# Patient Record
Sex: FEMALE | Race: WHITE | HISPANIC OR LATINO
[De-identification: names, ages, dates, MRNs, and addresses within clinical notes are randomized per-mention and may not be internally consistent; named-entity substitution may affect disease eponyms.]

---

## 2018-06-20 PROBLEM — Z00.00 ENCOUNTER FOR PREVENTIVE HEALTH EXAMINATION: Status: ACTIVE | Noted: 2018-06-20

## 2018-07-17 DIAGNOSIS — Z86.2 PERSONAL HISTORY OF DISEASES OF THE BLOOD AND BLOOD-FORMING ORGANS AND CERTAIN DISORDERS INVOLVING THE IMMUNE MECHANISM: ICD-10-CM

## 2018-07-17 DIAGNOSIS — Z86.59 PERSONAL HISTORY OF OTHER MENTAL AND BEHAVIORAL DISORDERS: ICD-10-CM

## 2018-07-17 DIAGNOSIS — Z87.19 PERSONAL HISTORY OF OTHER DISEASES OF THE DIGESTIVE SYSTEM: ICD-10-CM

## 2018-07-17 DIAGNOSIS — Z87.39 PERSONAL HISTORY OF OTHER DISEASES OF THE MUSCULOSKELETAL SYSTEM AND CONNECTIVE TISSUE: ICD-10-CM

## 2018-07-17 DIAGNOSIS — Z86.39 PERSONAL HISTORY OF OTHER ENDOCRINE, NUTRITIONAL AND METABOLIC DISEASE: ICD-10-CM

## 2018-07-17 RX ORDER — ATENOLOL 50 MG/1
50 TABLET ORAL TWICE DAILY
Refills: 0 | Status: ACTIVE | COMMUNITY

## 2018-07-17 RX ORDER — AMLODIPINE BESYLATE 5 MG/1
5 TABLET ORAL DAILY
Refills: 0 | Status: ACTIVE | COMMUNITY

## 2018-07-17 RX ORDER — FUROSEMIDE 20 MG/1
20 TABLET ORAL DAILY
Refills: 0 | Status: ACTIVE | COMMUNITY

## 2018-07-17 RX ORDER — DIGOXIN 125 UG/1
125 TABLET ORAL DAILY
Refills: 0 | Status: ACTIVE | COMMUNITY

## 2018-07-24 ENCOUNTER — APPOINTMENT (OUTPATIENT)
Dept: CARDIOLOGY | Facility: CLINIC | Age: 83
End: 2018-07-24

## 2018-07-24 ENCOUNTER — NON-APPOINTMENT (OUTPATIENT)
Age: 83
End: 2018-07-24

## 2018-07-24 VITALS
TEMPERATURE: 98.3 F | RESPIRATION RATE: 12 BRPM | WEIGHT: 176 LBS | OXYGEN SATURATION: 97 % | SYSTOLIC BLOOD PRESSURE: 149 MMHG | HEART RATE: 72 BPM | DIASTOLIC BLOOD PRESSURE: 64 MMHG

## 2018-07-24 RX ORDER — DULOXETINE HYDROCHLORIDE 30 MG/1
30 CAPSULE, DELAYED RELEASE ORAL EVERY 4 HOURS
Refills: 0 | Status: ACTIVE | COMMUNITY

## 2018-07-24 RX ORDER — DULOXETINE HYDROCHLORIDE 60 MG/1
60 CAPSULE, DELAYED RELEASE ORAL EVERY MORNING
Refills: 0 | Status: ACTIVE | COMMUNITY

## 2018-07-24 RX ORDER — OMEPRAZOLE 20 MG/1
20 CAPSULE, DELAYED RELEASE ORAL DAILY
Refills: 0 | Status: ACTIVE | COMMUNITY

## 2019-01-14 ENCOUNTER — APPOINTMENT (OUTPATIENT)
Dept: CARDIOLOGY | Facility: CLINIC | Age: 84
End: 2019-01-14
Payer: MEDICARE

## 2019-01-14 ENCOUNTER — NON-APPOINTMENT (OUTPATIENT)
Age: 84
End: 2019-01-14

## 2019-01-14 VITALS
OXYGEN SATURATION: 98 % | DIASTOLIC BLOOD PRESSURE: 64 MMHG | SYSTOLIC BLOOD PRESSURE: 134 MMHG | WEIGHT: 174 LBS | RESPIRATION RATE: 12 BRPM | HEART RATE: 97 BPM

## 2019-01-14 PROCEDURE — 93000 ELECTROCARDIOGRAM COMPLETE: CPT

## 2019-01-14 PROCEDURE — 99214 OFFICE O/P EST MOD 30 MIN: CPT

## 2019-01-14 RX ORDER — METFORMIN HYDROCHLORIDE 500 MG/1
500 TABLET, COATED ORAL TWICE DAILY
Refills: 0 | Status: ACTIVE | COMMUNITY

## 2019-01-14 NOTE — HISTORY OF PRESENT ILLNESS
[FreeTextEntry1] : 86 yo female resident from [facility] with chronic atrial fibrillation and hypertension. Patient presents today for follow-up. Doing well. She denies new complaints. Patient denies chest pain, dyspnea, palpitations, syncope, edema, melena, hematochezia, or hematemesis.\par \par PMD: Papito Marcum MD

## 2019-01-14 NOTE — PHYSICAL EXAM
[General Appearance - Well Developed] : well developed [General Appearance - In No Acute Distress] : no acute distress [No Oral Cyanosis] : no oral cyanosis [FreeTextEntry1] : No JVD present [] : no respiratory distress [Respiration, Rhythm And Depth] : normal respiratory rhythm and effort [Auscultation Breath Sounds / Voice Sounds] : lungs were clear to auscultation bilaterally [Bowel Sounds] : normal bowel sounds [Abdomen Soft] : soft [Abdomen Tenderness] : non-tender [Nail Clubbing] : no clubbing of the fingernails [Oriented To Time, Place, And Person] : oriented to person, place, and time [Normal Rate] : normal [Irregularly Irregular] : irregularly irregular [Normal S1] : normal S1 [Normal S2] : normal S2 [S3] : no S3 [S4] : no S4 [No Murmur] : no murmurs heard [Right Carotid Bruit] : no bruit heard over the right carotid [Left Carotid Bruit] : no bruit heard over the left carotid [2+] : left 2+ [No Pitting Edema] : no pitting edema present

## 2019-01-14 NOTE — ASSESSMENT
[FreeTextEntry1] : 86 yo female with chronic atrial fibrillation and hypertension. Patient is clinically stable from cardiac standpoint and without symptoms to suggest progression of cardiac disease. Echo on 9/22/17 demonstrated low normal LV systolic function, LVEF 50-55%, mild to mod MR, mild AR, mild to mod TR. Lexiscan nuclear stress test on 10/6/17 demonstrated normal rest/stress myocardial perfusion with normal LV wall motion, LVEF 55-60%.\par \par Patient's chronic atrial fibrillation is rate controlled on current regimen of atenolol 50 mg po bid, digoxin, and verapamil 120 mg po bid. \par Currently on coumadin, which is being managed by her PMD. Goal INR 2-3. Will continue to monitor on current management. \par \par Blood pressure is currently adequately controlled. Will continue to monitor on current regimen.\par \par RTC in 6 months

## 2019-07-01 ENCOUNTER — NON-APPOINTMENT (OUTPATIENT)
Age: 84
End: 2019-07-01

## 2019-07-01 ENCOUNTER — APPOINTMENT (OUTPATIENT)
Dept: CARDIOLOGY | Facility: CLINIC | Age: 84
End: 2019-07-01
Payer: MEDICARE

## 2019-07-01 VITALS
RESPIRATION RATE: 12 BRPM | DIASTOLIC BLOOD PRESSURE: 88 MMHG | WEIGHT: 164 LBS | OXYGEN SATURATION: 94 % | SYSTOLIC BLOOD PRESSURE: 152 MMHG | HEART RATE: 115 BPM

## 2019-07-01 DIAGNOSIS — E78.5 HYPERLIPIDEMIA, UNSPECIFIED: ICD-10-CM

## 2019-07-01 DIAGNOSIS — Z86.79 PERSONAL HISTORY OF OTHER DISEASES OF THE CIRCULATORY SYSTEM: ICD-10-CM

## 2019-07-01 PROCEDURE — 99213 OFFICE O/P EST LOW 20 MIN: CPT

## 2019-07-01 PROCEDURE — 93000 ELECTROCARDIOGRAM COMPLETE: CPT

## 2019-07-01 RX ORDER — DILTIAZEM HYDROCHLORIDE 240 MG/1
240 CAPSULE, COATED, EXTENDED RELEASE ORAL DAILY
Refills: 0 | Status: ACTIVE | COMMUNITY

## 2019-07-01 RX ORDER — MIRTAZAPINE 15 MG/1
15 TABLET, FILM COATED ORAL
Refills: 0 | Status: ACTIVE | COMMUNITY

## 2019-07-01 RX ORDER — ASPIRIN 81 MG/1
81 TABLET ORAL DAILY
Refills: 0 | Status: ACTIVE | COMMUNITY

## 2019-07-01 RX ORDER — LEVETIRACETAM 500 MG/1
500 TABLET, FILM COATED ORAL TWICE DAILY
Refills: 0 | Status: ACTIVE | COMMUNITY

## 2019-07-01 NOTE — ASSESSMENT
[FreeTextEntry1] : 88 yo female with chronic atrial fibrillation and hypertension. Patient is clinically stable from cardiac standpoint and without symptoms to suggest progression of cardiac disease. Echo on 9/22/17 demonstrated low normal LV systolic function, LVEF 50-55%, mild to mod MR, mild AR, mild to mod TR. Lexiscan nuclear stress test on 10/6/17 demonstrated normal rest/stress myocardial perfusion with normal LV wall motion, LVEF 55-60%.\par \par Patient's chronic atrial fibrillation is adequately rate controlled on current regimen of atenolol 50 mg po bid, digoxin, and diltiazem  mg po daily. . \par Currently on aspirin alone for thromboembolic prophylaxis following her intracranial hemorrhage due to fall/head trauma while on coumadin in Feb 2019. \par Will continue to monitor on current management. \par CBC and CMP on 4/2/19 were unremarkable.\par \par Blood pressure is currently adequately controlled. Will continue to monitor on current regimen.\par \par RTC in 6 months

## 2019-07-01 NOTE — PHYSICAL EXAM
[General Appearance - Well Developed] : well developed [General Appearance - In No Acute Distress] : no acute distress [No Oral Cyanosis] : no oral cyanosis [] : no respiratory distress [Respiration, Rhythm And Depth] : normal respiratory rhythm and effort [Auscultation Breath Sounds / Voice Sounds] : lungs were clear to auscultation bilaterally [Bowel Sounds] : normal bowel sounds [Abdomen Soft] : soft [Abdomen Tenderness] : non-tender [Nail Clubbing] : no clubbing of the fingernails [Oriented To Time, Place, And Person] : oriented to person, place, and time [Normal Rate] : normal [Irregularly Irregular] : irregularly irregular [Normal S1] : normal S1 [Normal S2] : normal S2 [No Murmur] : no murmurs heard [2+] : left 2+ [No Pitting Edema] : no pitting edema present [FreeTextEntry1] : No JVD present [S3] : no S3 [S4] : no S4 [Right Carotid Bruit] : no bruit heard over the right carotid [Left Carotid Bruit] : no bruit heard over the left carotid

## 2019-07-01 NOTE — HISTORY OF PRESENT ILLNESS
[FreeTextEntry1] : 88 yo female resident from [facility] with chronic atrial fibrillation and hypertension. Patient presents today for follow-up. Doing well. She denies new complaints. Patient denies chest pain, dyspnea, palpitations, syncope, edema, melena, hematochezia, or hematemesis.\par \par PMD: Papito Marcum MD

## 2019-07-15 ENCOUNTER — APPOINTMENT (OUTPATIENT)
Dept: CARDIOLOGY | Facility: CLINIC | Age: 84
End: 2019-07-15

## 2019-09-12 ENCOUNTER — APPOINTMENT (OUTPATIENT)
Dept: VASCULAR SURGERY | Facility: CLINIC | Age: 84
End: 2019-09-12
Payer: MEDICARE

## 2019-09-12 ENCOUNTER — APPOINTMENT (OUTPATIENT)
Dept: VASCULAR SURGERY | Facility: CLINIC | Age: 84
End: 2019-09-12

## 2019-09-12 DIAGNOSIS — Z87.39 PERSONAL HISTORY OF OTHER DISEASES OF THE MUSCULOSKELETAL SYSTEM AND CONNECTIVE TISSUE: ICD-10-CM

## 2019-09-12 PROCEDURE — 99203 OFFICE O/P NEW LOW 30 MIN: CPT

## 2019-09-12 NOTE — DATA REVIEWED
[FreeTextEntry1] : Lower extremity arterial studies performed at [facility] CV lab on 8/22/19\par R DWIGHT 1.05, L DWIGHT 1.11 - some non compressibility of vessels, DWIGHT may be falsely elevated\par R great toe pressure 88, L great toe pressure 60

## 2019-09-12 NOTE — HISTORY OF PRESENT ILLNESS
[FreeTextEntry1] : 88 y/o F here for evaluation of burning pain on the soles of both feet.\par She sustained a severe injury to her left ankle with a comminuted displaced left trimalleolar fracture for which she underwent surgical correction with ORIF with Dr. Pickens in May 2016. She has been receiving injections to the left ankle, most recent being May 2019.\par She reports burning pain of the feet and left ankle, most bothersome at the soles of her feet. She reports that this discomfort has been present for over a year. There are no specific aggravating or alleviating factors, and there is no positional relation to her symptoms.\par She resides at [facility].\par She does have a hx of long standing DM. She does not know if it under good control.

## 2019-09-12 NOTE — PHYSICAL EXAM
[Normal Breath Sounds] : Normal breath sounds [2+] : left 2+ [1+] : left 1+ [Ankle Swelling On The Left] : of the left ankle [Ankle Swelling (On Exam)] : present [0] : left 0 [Ankle Swelling On The Right] : mild [Varicose Veins Of Lower Extremities] : not present [No Rash or Lesion] : No rash or lesion [] : not present [Alert] : alert [Oriented to Person] : oriented to person [Calm] : calm [Oriented to Place] : oriented to place [de-identified] : NAD [de-identified] : soft, NT, ND [de-identified] : supple [de-identified] : NCAT

## 2019-09-12 NOTE — ASSESSMENT
[FreeTextEntry1] : 88 y/o F w/ b/l burning pain in both feet (soles) as well as left ankle pain\par I believe her symptoms may be due to the onset and progression of diabetic neuropathy\par She does have evidence of arterial disease, and her non invasive arterial study performed at [facility] does indicate evidence of pedal vascular disease, consistent with the history of long standing DM.\par We discussed the importance of consistent foot exams and excellent foot hygiene.\par She will seek further workup of neuropathy by her PCP and possibly a neurologist.\par She will f/u with me as needed.

## 2020-01-03 ENCOUNTER — NON-APPOINTMENT (OUTPATIENT)
Age: 85
End: 2020-01-03

## 2020-01-03 ENCOUNTER — APPOINTMENT (OUTPATIENT)
Dept: CARDIOLOGY | Facility: CLINIC | Age: 85
End: 2020-01-03
Payer: MEDICARE

## 2020-01-03 VITALS
WEIGHT: 182.38 LBS | RESPIRATION RATE: 12 BRPM | HEART RATE: 120 BPM | OXYGEN SATURATION: 90 % | DIASTOLIC BLOOD PRESSURE: 82 MMHG | SYSTOLIC BLOOD PRESSURE: 156 MMHG

## 2020-01-03 PROCEDURE — 99214 OFFICE O/P EST MOD 30 MIN: CPT

## 2020-01-03 PROCEDURE — 93000 ELECTROCARDIOGRAM COMPLETE: CPT

## 2020-01-03 RX ORDER — GABAPENTIN 100 MG/1
100 CAPSULE ORAL 3 TIMES DAILY
Refills: 0 | Status: ACTIVE | COMMUNITY

## 2020-01-03 NOTE — REVIEW OF SYSTEMS
[Negative] : Endocrine [Dyspnea on exertion] : dyspnea during exertion [Lower Ext Edema] : lower extremity edema [Palpitations] : palpitations [Chest Pain] : no chest pain

## 2020-01-03 NOTE — PHYSICAL EXAM
[General Appearance - Well Developed] : well developed [No Oral Cyanosis] : no oral cyanosis [General Appearance - In No Acute Distress] : no acute distress [] : no respiratory distress [Respiration, Rhythm And Depth] : normal respiratory rhythm and effort [Auscultation Breath Sounds / Voice Sounds] : lungs were clear to auscultation bilaterally [Bowel Sounds] : normal bowel sounds [Abdomen Soft] : soft [Abdomen Tenderness] : non-tender [Nail Clubbing] : no clubbing of the fingernails [Oriented To Time, Place, And Person] : oriented to person, place, and time [Irregularly Irregular] : irregularly irregular [Normal S1] : normal S1 [Normal S2] : normal S2 [No Murmur] : no murmurs heard [2+] : left 2+ [Tachycardia] : tachycardic [___ +] : bilateral [unfilled]U+ pretibial pitting edema [FreeTextEntry1] : MAGUI present [S3] : no S3 [S4] : no S4 [Right Carotid Bruit] : no bruit heard over the right carotid [Left Carotid Bruit] : no bruit heard over the left carotid

## 2020-01-03 NOTE — HISTORY OF PRESENT ILLNESS
[FreeTextEntry1] : 89 yo female resident from [facility] with chronic atrial fibrillation and hypertension. Patient presents today for follow-up. She reports exertional dyspnea and leg edema over the past 2 months. Her ECG here demonstrated rapid atrial fibrillation with  bpm. Patient denies chest pain, palpitations, syncope, edema, melena, hematochezia, or hematemesis.\par \par PMD: Papito Marcum MD

## 2020-01-03 NOTE — ASSESSMENT
[FreeTextEntry1] : 87 yo female with chronic atrial fibrillation and hypertension. Patient is in rapid atrial fibrillation per ECG today and with exertional dyspnea and leg edema with O2 sat 90%. Echo on 9/22/17 demonstrated low normal LV systolic function, LVEF 50-55%, mild to mod MR, mild AR, mild to mod TR. Lexiscan nuclear stress test on 10/6/17 demonstrated normal rest/stress myocardial perfusion with normal LV wall motion, LVEF 55-60%.\par \par Patient's chronic atrial fibrillation is currently not adequately rate controlled on current regimen of atenolol 50 mg po bid, digoxin, and diltiazem  mg po daily. . \par Given symptoms of heart failure and rapid atrial fibrillation, patient will be sent to [facility] for inpatient management/evaluation. I already discussed patient's case with [facility] -> patient to be started on cardizem gtt and will likely require some diuresis.\par Currently on aspirin alone for thromboembolic prophylaxis following her intracranial hemorrhage due to fall/head trauma while on coumadin in Feb 2019. \par \par Blood pressure is currently not controlled in setting of rapid atrial fibrillation and mild heart failure.\par Patient to be sent to [facility] for inpatient management as above..

## 2020-01-13 ENCOUNTER — NON-APPOINTMENT (OUTPATIENT)
Age: 85
End: 2020-01-13

## 2020-01-13 ENCOUNTER — APPOINTMENT (OUTPATIENT)
Dept: CARDIOLOGY | Facility: CLINIC | Age: 85
End: 2020-01-13
Payer: MEDICARE

## 2020-01-13 VITALS
HEART RATE: 99 BPM | OXYGEN SATURATION: 94 % | RESPIRATION RATE: 12 BRPM | WEIGHT: 169 LBS | SYSTOLIC BLOOD PRESSURE: 158 MMHG | DIASTOLIC BLOOD PRESSURE: 84 MMHG

## 2020-01-13 DIAGNOSIS — I48.20 CHRONIC ATRIAL FIBRILLATION, UNSP: ICD-10-CM

## 2020-01-13 DIAGNOSIS — I50.31 ACUTE DIASTOLIC (CONGESTIVE) HEART FAILURE: ICD-10-CM

## 2020-01-13 DIAGNOSIS — I10 ESSENTIAL (PRIMARY) HYPERTENSION: ICD-10-CM

## 2020-01-13 PROCEDURE — 93000 ELECTROCARDIOGRAM COMPLETE: CPT

## 2020-01-13 PROCEDURE — 93005 ELECTROCARDIOGRAM TRACING: CPT

## 2020-01-13 PROCEDURE — 93010 ELECTROCARDIOGRAM REPORT: CPT | Mod: 26

## 2020-01-13 PROCEDURE — 99214 OFFICE O/P EST MOD 30 MIN: CPT

## 2020-01-13 PROCEDURE — 36415 COLL VENOUS BLD VENIPUNCTURE: CPT

## 2020-01-13 RX ORDER — SPIRONOLACTONE 25 MG/1
25 TABLET ORAL
Refills: 0 | Status: ACTIVE | COMMUNITY

## 2020-01-13 NOTE — REASON FOR VISIT
[Atrial Fibrillation] : atrial fibrillation [Follow-Up - Clinic] : a clinic follow-up of [Hypertension] : hypertension

## 2020-01-13 NOTE — PHYSICAL EXAM
[General Appearance - In No Acute Distress] : no acute distress [General Appearance - Well Developed] : well developed [No Oral Cyanosis] : no oral cyanosis [] : no respiratory distress [Respiration, Rhythm And Depth] : normal respiratory rhythm and effort [Auscultation Breath Sounds / Voice Sounds] : lungs were clear to auscultation bilaterally [Abdomen Soft] : soft [Abdomen Tenderness] : non-tender [Bowel Sounds] : normal bowel sounds [Nail Clubbing] : no clubbing of the fingernails [Oriented To Time, Place, And Person] : oriented to person, place, and time [Normal S2] : normal S2 [Normal S1] : normal S1 [Irregularly Irregular] : irregularly irregular [No Murmur] : no murmurs heard [2+] : right 2+ [Normal Conjunctiva] : the conjunctiva exhibited no abnormalities [FreeTextEntry1] : NCAT [Normal Rate] : normal [S3] : no S3 [S4] : no S4 [Right Carotid Bruit] : no bruit heard over the right carotid [Left Carotid Bruit] : no bruit heard over the left carotid [No Pitting Edema] : no pitting edema present

## 2020-01-13 NOTE — ASSESSMENT
[FreeTextEntry1] : 87 yo female with chronic atrial fibrillation and hypertension. Patient is in rapid atrial fibrillation per ECG today and with exertional dyspnea and leg edema with O2 sat 90%. Echo on 9/22/17 demonstrated low normal LV systolic function, LVEF 50-55%, mild to mod MR, mild AR, mild to mod TR. Lexiscan nuclear stress test on 10/6/17 demonstrated normal rest/stress myocardial perfusion with normal LV wall motion, LVEF 55-60%.\par \par Patient is clinically stable and appears to be euvolemic.\par Will continue current diuretic regimen of furosemide 20 mg po daily and spironolactone 25 mg po daily.\par Will check CMP today and call with results.\par \par Chronic atrial fibrillation is currently adequately rate controlled on current regimen of atenolol 50 mg po bid, digoxin, and diltiazem  mg po daily. \par Currently on aspirin alone for thromboembolic prophylaxis following her intracranial hemorrhage due to fall/head trauma while on coumadin in Feb 2019. \par \par Blood pressure is currently controlled. Will continue to monitor on current regimen.

## 2020-01-13 NOTE — HISTORY OF PRESENT ILLNESS
[FreeTextEntry1] : 89 yo female resident from [facility] with chronic atrial fibrillation and hypertension. Patient presents today for follow-up after her [facility] hospitalization (1/3/20 - 1/6/20) for rapid afib and acute HFpEF. Patient is currently doing well. Patient denies chest pain, dyspnea, palpitations, syncope, edema, melena, hematochezia, or hematemesis.\par \par PMD: Papito Marcum MD

## 2020-01-15 LAB
ALBUMIN SERPL ELPH-MCNC: 4.5 G/DL
ALP BLD-CCNC: 137 U/L
ALT SERPL-CCNC: 10 U/L
ANION GAP SERPL CALC-SCNC: 15 MMOL/L
AST SERPL-CCNC: 15 U/L
BILIRUB SERPL-MCNC: 0.4 MG/DL
BUN SERPL-MCNC: 9 MG/DL
CALCIUM SERPL-MCNC: 9.7 MG/DL
CHLORIDE SERPL-SCNC: 101 MMOL/L
CO2 SERPL-SCNC: 27 MMOL/L
CREAT SERPL-MCNC: 0.73 MG/DL
GLUCOSE SERPL-MCNC: 111 MG/DL
POTASSIUM SERPL-SCNC: 4.6 MMOL/L
PROT SERPL-MCNC: 6.8 G/DL
SODIUM SERPL-SCNC: 143 MMOL/L

## 2021-09-16 ENCOUNTER — APPOINTMENT (OUTPATIENT)
Dept: NEUROLOGY | Facility: CLINIC | Age: 86
End: 2021-09-16
Payer: MEDICARE

## 2021-09-16 ENCOUNTER — NON-APPOINTMENT (OUTPATIENT)
Age: 86
End: 2021-09-16

## 2021-09-16 DIAGNOSIS — Z87.898 PERSONAL HISTORY OF OTHER SPECIFIED CONDITIONS: ICD-10-CM

## 2021-09-16 DIAGNOSIS — R29.6 REPEATED FALLS: ICD-10-CM

## 2021-09-16 PROCEDURE — 99214 OFFICE O/P EST MOD 30 MIN: CPT

## 2021-09-20 NOTE — ASSESSMENT
[FreeTextEntry1] : Routine EEG (1 hour) \par Basic labs \par Physical Therapy \par \par RTC in 3 months

## 2021-09-20 NOTE — PHYSICAL EXAM
[FreeTextEntry1] : Status:  Knows the hospital. thinks it is 1931. Able to name items. Does not know the year. Unable to calculate quarters in $1.50. \par CN: visual acuity, VFF, blink to confrontation \par III, IV, VI, PERRL, EOMI \par V sensation normal to light touch \par VII normal squint vs resistance, normal smile, face symmetric \par VIII: diminished hearing to voice \par IX, X normal gag, symmetric palate, uvula raises midline \par XI normal shrug versus resistance and lateralization of head versus resistance \par XII tongue symmetric, normal strength, no tremor or fasciculation \par motor: full strength throughout \par Sensory: normal to LT \par Reflexes \par Brachioradialis, biceps, triceps 1+, patella 1+ and ankles 1+ \par Plantar flexor response bilaterally \par Coordination: no dysmetria on FNF \par Gait :slightly wide based, unsteady \par \par \par

## 2021-09-20 NOTE — DATA REVIEWED
[de-identified] : MRI brain : 8/1/21:\par chronic microvascular disease, no acute intracranial process. \par \par 7/31/21: \par CTA head/neck \par FINDINGS: \par ANTERIOR CIRCULATION: \par Right internal carotid artery: Unremarkable. Intracranial segment is patent \par with no significant stenosis. No aneurysm. \par Right middle cerebral artery: Both middle cerebral arteries are patent. \par Right anterior cerebral artery: Both anterior cerebral arteries are patent. \par \par Left internal carotid artery: Unremarkable. Intracranial segment is patent with \par no significant stenosis. No aneurysm. \par Left middle cerebral artery: Unremarkable. No occlusion or significant \par stenosis. No aneurysm.  \par Left anterior cerebral artery: Unremarkable. No occlusion or significant \par stenosis. No aneurysm.  \par \par POSTERIOR CIRCULATION: \par Right vertebral artery: Unremarkable. No occlusion or significant stenosis. No \par aneurysm.  \par Left vertebral artery: Unremarkable. No occlusion or significant stenosis. No \par aneurysm.  \par Basilar artery: Basilar artery patent. \par Right posterior cerebral artery: Unremarkable. No occlusion or significant \par stenosis. No aneurysm.  \par Left posterior cerebral artery: Unremarkable. No occlusion or significant \par stenosis. No aneurysm.  \par Superior cerebellar arteries: Both superior cerebellar arteries patent. \par \par Right common carotid artery: Both intracranial internal carotid arteries are \par patent with some dolichoectasia. \par Other arteries: Codominant distal vertebral arteries are patent. Both posterior \par cerebral arteries patent. \par Brain: No definite mass, mass effect, or midline shift. \par Cerebral ventricles: No ventriculomegaly. \par Bones/joints: Unremarkable. No acute fracture. \par Soft tissues: Unremarkable. \par \par IMPRESSION: \par No evidence of intracranial major vessel occlusion. \par \par =========================\par \par COMPARISON: \par CT BRAIN PERFUSION 7/31/2021 5:52 AM \par \par FINDINGS: \par Right common carotid artery: Moderately severe atherosclerotic change is noted \par involving the right common carotid bifurcation. There is mild stenosis \par involving the origin of the right internal carotid artery and moderate \par narrowing involving the origin right external carotid artery. No evidence of \par critical stenosis. Atherosclerosis left common carotid artery with moderate \par narrowing origin left internal carotid artery no more than approximately 50%. \par Right internal carotid artery: common carotid artery \par Right external carotid artery: No occlusion or stenosis of the origin.  \par \par Left common carotid artery \par Left internal carotid artery: No stenosis of the extracranial segment. No \par dissection or occlusion. \par Left external carotid artery: No occlusion or stenosis of the origin.  \par \par Right vertebral artery: No stenosis. No dissection or occlusion. \par Left vertebral artery: No stenosis. No dissection or occlusion. \par \par Aorta: Atherosclerotic changes thoracic aorta; origin great vessels intact with \par atherosclerotic changes particularly left subclavian artery. \par Other arteries: Codominant vertebral arteries patent in the neck. \par \par Thyroid: There is a 1.1 cm enhancing nodule in the isthmus of the thyroid gland \par to the right of midline as well as an approximately 1.3 cm low-attenuation \par lesion left lobe of thyroid gland could be further assessed with sonography \par following clinical assessment. \par Soft tissues: Normal. No significant soft tissue swelling. \par \par Bones/joints: Advanced degenerative changes cervical spine. \par \par Lungs: Partial visualization lung apices demonstrates an ill-defined nodular \par opacity in the posterior aspect right upper lobe image 76 series 3 measuring up \par to approximately 1 cm should be further evaluated with dedicated chest imaging. \par \par IMPRESSION: \par 1. Moderately severe atherosclerotic change is noted involving the right common \par carotid bifurcation. There is mild stenosis involving the origin of the right \par internal carotid artery and moderate narrowing involving the origin right \par external carotid artery. No evidence of critical stenosis. Atherosclerosis left \par common carotid artery with moderate narrowing origin left internal carotid \par artery no more than approximately 50%. \par 2. Both vertebral arteries are patent in the neck. \par 3. There is a 1.1 cm enhancing nodule in the isthmus of the thyroid gland to \par the right of midline as well as an approximately 1.3 cm low-attenuation lesion \par left lobe of thyroid gland could be further assessed with sonography following \par clinical assessment. \par 4. Partial visualization lung apices demonstrates an ill-defined nodular \par opacity in the posterior aspect right upper lobe image 76 series 3 measuring up \par to approximately 1 cm should be further evaluated with dedicated chest imaging.\par \par  [de-identified] : 8/31/21: FINAL Summary:\par Abnormal continuous av-EEG study.\par 1)	Occasional bilateral temporal delta slowing, maximal anteriorly. \par 2)	Mild generalized slowing. \par Final Clinical Correlation:\par Occasional bilateral temporal delta slowing, maximal anteriorly. Mild generalized slowing suggestive of diffuse or multifocal dysfunction. There were no findings of active epilepsy, however this alone does not rule out the diagnosis. \par \par Neftali Alexander MD\par

## 2021-09-20 NOTE — DISCUSSION/SUMMARY
[FreeTextEntry1] : Deborah Hartman is an 89-year-old lady with a past medical history of a traumatic SDH, question of seizure disorder?  (unclear if she was only supposed to be on LEV for short duration of time), DM, MDD, paroxysmal atrial fibrillation, not on AC and DM neuropathy. She is presenting with sudden onset of an episode of transient unresponsiveness. EEG with mild generalized slowing, occasional bitemporal delta slowing, maximal anteriorly. For now will continue home medication of  mg bid. However will get a routine EEG. If no epileptiform potentials, may be able to slowly taper LEV as no clear indication. \par \par Will order PT for gait stability. Is able to walk unassisted  although slightly unsteady

## 2021-09-20 NOTE — HISTORY OF PRESENT ILLNESS
[FreeTextEntry1] : Deborah Hartman is an 89-year-old lady, nursing home resident of [facility], with a past medical history of traumatic SDH, DM, MDD, paroxysmal atrial fibrillation not on AC and DM neuropathy. \par \par She was admitted to [facility] in July 2021 for episode of transient unresponsiveness. \par She had a continuous EEG which showed occasional bitemporal delta slowing, maximal anteriorly and mild generalized slowing suggestive of diffuse or multifocal dysfunction. No epileptiform patterns were seen. \par As per these chart notes, it is unclear if she has a seizure disorder. She had a history of a subdural hemorrhage, but it is not clear whether she was required to be on LEV for greater than 7 days. \par \par _______________________________\par \par

## 2021-09-22 ENCOUNTER — APPOINTMENT (OUTPATIENT)
Dept: NEUROLOGY | Facility: CLINIC | Age: 86
End: 2021-09-22
Payer: MEDICARE

## 2021-09-22 PROCEDURE — 95816 EEG AWAKE AND DROWSY: CPT

## 2021-10-04 ENCOUNTER — RESULT REVIEW (OUTPATIENT)
Age: 86
End: 2021-10-04

## 2021-10-06 PROBLEM — I10 ESSENTIAL HYPERTENSION: Status: ACTIVE | Noted: 2018-07-17

## 2021-10-11 ENCOUNTER — RESULT REVIEW (OUTPATIENT)
Age: 86
End: 2021-10-11

## 2021-12-09 ENCOUNTER — APPOINTMENT (OUTPATIENT)
Dept: NEUROLOGY | Facility: CLINIC | Age: 86
End: 2021-12-09

## 2022-05-10 ENCOUNTER — RESULT REVIEW (OUTPATIENT)
Age: 87
End: 2022-05-10

## 2022-05-26 ENCOUNTER — TRANSCRIPTION ENCOUNTER (OUTPATIENT)
Age: 87
End: 2022-05-26